# Patient Record
Sex: FEMALE | Race: BLACK OR AFRICAN AMERICAN | NOT HISPANIC OR LATINO | Employment: FULL TIME | URBAN - METROPOLITAN AREA
[De-identification: names, ages, dates, MRNs, and addresses within clinical notes are randomized per-mention and may not be internally consistent; named-entity substitution may affect disease eponyms.]

---

## 2022-05-12 ENCOUNTER — HOSPITAL ENCOUNTER (EMERGENCY)
Facility: CLINIC | Age: 37
Discharge: HOME | End: 2022-05-12
Attending: EMERGENCY MEDICINE | Admitting: EMERGENCY MEDICINE
Payer: COMMERCIAL

## 2022-05-12 VITALS
HEIGHT: 61 IN | DIASTOLIC BLOOD PRESSURE: 88 MMHG | SYSTOLIC BLOOD PRESSURE: 139 MMHG | BODY MASS INDEX: 29.62 KG/M2 | WEIGHT: 156.9 LBS | RESPIRATION RATE: 14 BRPM | HEART RATE: 84 BPM | OXYGEN SATURATION: 100 %

## 2022-05-12 DIAGNOSIS — H10.13 ALLERGIC CONJUNCTIVITIS, BILATERAL: ICD-10-CM

## 2022-05-12 PROCEDURE — 99284 EMERGENCY DEPT VISIT MOD MDM: CPT

## 2022-05-12 PROCEDURE — 99284 EMERGENCY DEPT VISIT MOD MDM: CPT | Performed by: EMERGENCY MEDICINE

## 2022-05-12 PROCEDURE — 250N000009 HC RX 250: Performed by: EMERGENCY MEDICINE

## 2022-05-12 RX ORDER — CETIRIZINE HYDROCHLORIDE 10 MG/1
10 TABLET ORAL DAILY
Qty: 30 TABLET | Refills: 0 | Status: SHIPPED | OUTPATIENT
Start: 2022-05-12 | End: 2022-08-11

## 2022-05-12 RX ORDER — OLOPATADINE HYDROCHLORIDE 1 MG/ML
1 SOLUTION/ DROPS OPHTHALMIC 2 TIMES DAILY
Qty: 10 ML | Refills: 0 | Status: SHIPPED | OUTPATIENT
Start: 2022-05-12 | End: 2022-06-11

## 2022-05-12 RX ORDER — PROPARACAINE HYDROCHLORIDE 5 MG/ML
1 SOLUTION/ DROPS OPHTHALMIC ONCE
Status: COMPLETED | OUTPATIENT
Start: 2022-05-12 | End: 2022-05-12

## 2022-05-12 RX ADMIN — FLUORESCEIN SODIUM 1 STRIP: 1 STRIP OPHTHALMIC at 11:58

## 2022-05-12 RX ADMIN — PROPARACAINE HYDROCHLORIDE 1 DROP: 5 SOLUTION/ DROPS OPHTHALMIC at 11:58

## 2022-05-12 ASSESSMENT — ENCOUNTER SYMPTOMS
EYE DISCHARGE: 1
VOMITING: 0
COUGH: 0
EYE PAIN: 1
EYE ITCHING: 1
SORE THROAT: 0
NAUSEA: 0
HEADACHES: 0
PHOTOPHOBIA: 0
SHORTNESS OF BREATH: 0
DIFFICULTY URINATING: 0
EYE REDNESS: 0
NECK PAIN: 0
FEVER: 0
DYSURIA: 0
ABDOMINAL PAIN: 0
BACK PAIN: 0
RHINORRHEA: 1

## 2022-05-12 ASSESSMENT — VISUAL ACUITY: OU: 1

## 2022-05-12 NOTE — DISCHARGE INSTRUCTIONS
Use Patanol eyedrops as directed  Use cetirizine as directed.    Follow-up with your primary care clinic in 1 to 2 weeks if not improving.    Return the emergency department if worsening symptoms, trouble with your vision, or other concerns.

## 2022-05-12 NOTE — ED PROVIDER NOTES
ED Provider Note  [facility], [place]      History     Chief Complaint   Patient presents with     Eye Pain     Headache     HPI  Hannah Cornejo is a 36 year old female who presents to the emergency department 3-day history of sneezing, watery eyes, and itchy eyes.  Patient states that she has been scratching her eye and now feels that her eyes are painful as well.  She reports normal vision.  She reports that her head hurts when she sneezes but otherwise denies persistent headache.  She denies any photophobia.  No nausea or vomiting.  No sore throat.  No fevers.  She reports that she is COVID vaccinated.  She has a history of seasonal allergies but states she is not taking any medications currently.    Past Medical History  History reviewed. No pertinent past medical history.  History reviewed. No pertinent surgical history.  cetirizine (ZYRTEC) 10 MG tablet  olopatadine (PATANOL) 0.1 % ophthalmic solution  PANTOPRAZOLE SODIUM PO      Allergies   Allergen Reactions     Beta Adrenergic Blockers Other (See Comments)     Contraindicated with allergy injections  Contraindicated with allergy injections       Meclizine Other (See Comments)     Reported generalized weakness and dry mouth when taking this along with imitrex.  Requested both be placed on her allergy list.      Sumatriptan Other (See Comments)     Reported generalized weakness and dry mouth when taking this together with meclizine. Requested both be placed on her allergy list.      Family History  History reviewed. No pertinent family history.  Social History   Social History     Tobacco Use     Smoking status: Never Smoker     Smokeless tobacco: Never Used   Substance Use Topics     Alcohol use: No     Drug use: No      Past medical history, past surgical history, medications, allergies, family history, and social history were reviewed with the patient. No additional pertinent items.       Review of Systems   Constitutional: Negative for  "fever.   HENT: Positive for rhinorrhea. Negative for sore throat.    Eyes: Positive for pain, discharge and itching. Negative for photophobia, redness and visual disturbance.   Respiratory: Negative for cough and shortness of breath.    Cardiovascular: Negative for chest pain.   Gastrointestinal: Negative for abdominal pain, nausea and vomiting.   Genitourinary: Negative for difficulty urinating and dysuria.   Musculoskeletal: Negative for back pain and neck pain.   Skin: Negative for rash.   Neurological: Negative for headaches.   All other systems reviewed and are negative.    A complete review of systems was performed with pertinent positives and negatives noted in the HPI, and all other systems negative.    Physical Exam   BP: 139/88  Pulse: 84  Resp: 16  Height: 154.9 cm (5' 1\")  Weight: 71.2 kg (156 lb 14.4 oz)  SpO2: 100 %  Physical Exam  Vitals and nursing note reviewed.   Constitutional:       Appearance: Normal appearance.   HENT:      Head: Normocephalic and atraumatic.      Nose: Nose normal.   Eyes:      General: Lids are normal. Vision grossly intact.         Right eye: No foreign body.         Left eye: No foreign body.      Extraocular Movements: Extraocular movements intact.      Right eye: Normal extraocular motion.      Left eye: Normal extraocular motion.      Conjunctiva/sclera:      Right eye: Right conjunctiva is injected.      Left eye: Left conjunctiva is injected.      Pupils: Pupils are equal, round, and reactive to light.      Right eye: No corneal abrasion or fluorescein uptake.      Left eye: No corneal abrasion or fluorescein uptake.      Slit lamp exam:     Right eye: Anterior chamber quiet. No corneal flare or foreign body.      Left eye: Anterior chamber quiet. No corneal flare or foreign body.   Cardiovascular:      Rate and Rhythm: Normal rate and regular rhythm.      Pulses: Normal pulses.      Heart sounds: Normal heart sounds.   Pulmonary:      Effort: Pulmonary effort is " normal.      Breath sounds: Normal breath sounds.   Neurological:      General: No focal deficit present.      Mental Status: She is alert.      Cranial Nerves: No cranial nerve deficit.      Motor: No weakness.      Coordination: Coordination normal.   Psychiatric:         Mood and Affect: Mood normal.         Behavior: Behavior normal.         ED Course      Procedures       The medical record was reviewed and interpreted.   utilized.              No results found for any visits on 05/12/22.  Medications   proparacaine (ALCAINE) 0.5 % ophthalmic solution 1 drop (1 drop Both Eyes Given 5/12/22 1158)   fluorescein (FUL-RUSSEL) ophthalmic strip 1 strip (1 strip Both Eyes Given 5/12/22 1158)        Assessments & Plan (with Medical Decision Making)   36 year old female with history of seasonal allergies to the emergency department with clear rhinorrhea, sneezing, tearing of the eyes, and pruritus.  The patient has been scratching her eyes and is concerned that she injured her eye.  She has no evidence for fluorescein uptake or abnormality on slit-lamp exam.  Her conjunctiva are injected.  Her symptoms are consistent with allergic conjunctivitis.  Patient will be discharged home with prescriptions for cetirizine and Patanol.  Primary care follow-up in 1 week as needed.  No evidence for infectious symptoms or corneal injury.    I have reviewed the nursing notes. I have reviewed the findings, diagnosis, plan and need for follow up with the patient.    New Prescriptions    CETIRIZINE (ZYRTEC) 10 MG TABLET    Take 1 tablet (10 mg) by mouth daily    OLOPATADINE (PATANOL) 0.1 % OPHTHALMIC SOLUTION    Apply 1 drop to eye 2 times daily       Final diagnoses:   Allergic conjunctivitis, bilateral     Chart documentation was completed with Dragon voice-recognition software. Even though reviewed, this chart may still contain some grammatical, spelling, and word errors.     --  Kenny Muñoz Md  [facility]  EMERGENCY DEPARTMENT  5/12/2022     Kenny Muñoz MD  05/12/22 1752

## 2022-08-11 ENCOUNTER — OFFICE VISIT (OUTPATIENT)
Dept: URGENT CARE | Facility: URGENT CARE | Age: 37
End: 2022-08-11
Payer: COMMERCIAL

## 2022-08-11 VITALS
HEIGHT: 61 IN | RESPIRATION RATE: 15 BRPM | OXYGEN SATURATION: 97 % | HEART RATE: 83 BPM | BODY MASS INDEX: 29.27 KG/M2 | DIASTOLIC BLOOD PRESSURE: 82 MMHG | TEMPERATURE: 97.2 F | SYSTOLIC BLOOD PRESSURE: 118 MMHG | WEIGHT: 155 LBS

## 2022-08-11 DIAGNOSIS — H10.13 ALLERGIC CONJUNCTIVITIS, BILATERAL: Primary | ICD-10-CM

## 2022-08-11 DIAGNOSIS — J30.2 SEASONAL ALLERGIC RHINITIS, UNSPECIFIED TRIGGER: ICD-10-CM

## 2022-08-11 PROCEDURE — 99203 OFFICE O/P NEW LOW 30 MIN: CPT | Performed by: PHYSICIAN ASSISTANT

## 2022-08-11 RX ORDER — OLOPATADINE HYDROCHLORIDE 1 MG/ML
1 SOLUTION/ DROPS OPHTHALMIC 2 TIMES DAILY
COMMUNITY

## 2022-08-11 RX ORDER — CARBOXYMETHYLCELLULOSE SODIUM 5 MG/ML
1 SOLUTION/ DROPS OPHTHALMIC 4 TIMES DAILY
Qty: 1 EACH | Refills: 3 | Status: SHIPPED | OUTPATIENT
Start: 2022-08-11

## 2022-08-11 RX ORDER — OLOPATADINE HYDROCHLORIDE 1 MG/ML
1 SOLUTION/ DROPS OPHTHALMIC 2 TIMES DAILY
Qty: 5 ML | Refills: 3 | Status: SHIPPED | OUTPATIENT
Start: 2022-08-11

## 2022-08-11 RX ORDER — CETIRIZINE HYDROCHLORIDE 10 MG/1
10 TABLET ORAL DAILY
Qty: 90 TABLET | Refills: 1 | Status: SHIPPED | OUTPATIENT
Start: 2022-08-11

## 2022-08-11 RX ORDER — FLUTICASONE PROPIONATE 50 MCG
1 SPRAY, SUSPENSION (ML) NASAL DAILY
Qty: 16 G | Refills: 3 | Status: SHIPPED | OUTPATIENT
Start: 2022-08-11

## 2022-08-11 ASSESSMENT — ENCOUNTER SYMPTOMS
EYE ITCHING: 1
WHEEZING: 0
EYE REDNESS: 1
FEVER: 0
COUGH: 0

## 2022-08-12 NOTE — PROGRESS NOTES
Assessment & Plan:        ICD-10-CM    1. Allergic conjunctivitis, bilateral  H10.13 olopatadine (PATANOL) 0.1 % ophthalmic solution     carboxymethylcellulose PF (CARBOXYMETHYLCELLULOSE SODIUM) 0.5 % ophthalmic solution   2. Seasonal allergic rhinitis, unspecified trigger  J30.2 cetirizine (ZYRTEC) 10 MG tablet     fluticasone (FLONASE) 50 MCG/ACT nasal spray         Plan/Clinical Decision Making:    Patient with seasonal allergies and allergic conjunctivitis.   Exam consistent with allergies.     Patient Instructions   Use the medications on a daily basis, that is what will work the best.     1) Use the allergy eye drops twice a day and the refresh drops 4x a day.     2) Use the oral pill and start the nasal spray.     3) it can take a week of using all of these to get relief.     Follow up in 1 week for recheck.     Thanks,  Melva Pace PA-C        Return in about 1 week (around 8/18/2022).     At the end of the encounter, I discussed results, diagnosis, medications. Discussed red flags for immediate return to clinic/ER, as well as indications for follow up if no improvement. Patient understood and agreed to plan. Patient was stable for discharge.        Melva Pace PA-C on 8/11/2022 at 7:10 PM          Subjective:     HPI:    Hannah is a 37 year old female who presents to clinic today for the following health issues:  Chief Complaint   Patient presents with     Urgent Care     Derm Problem     Face and eyes are bothering her, itching. On and off for a while but consistent for 1 week.      HPI    Itching face, eyes, sneezing, scratchy, itchy throat.   Off and on.  This past week worse.   Was seen for allergic conjunctivitis in May, treated with Patanol, not working.   Was prescribed Zyrtec in past, finished with that and not on now.   Has used nasal spray in past- Flonase, felt like it didn't work.     History obtained from the patient.    Review of Systems   Constitutional: Negative for fever.  "  HENT: Positive for congestion and sneezing.    Eyes: Positive for redness and itching. Negative for photophobia and visual disturbance.   Respiratory: Negative for cough and wheezing.          There is no problem list on file for this patient.       No past medical history on file.    Social History     Tobacco Use     Smoking status: Never Smoker     Smokeless tobacco: Never Used   Substance Use Topics     Alcohol use: No             Objective:     Vitals:    08/11/22 1845   BP: 118/82   Pulse: 83   Resp: 15   Temp: 97.2  F (36.2  C)   TempSrc: Temporal   SpO2: 97%   Weight: 70.3 kg (155 lb)   Height: 1.549 m (5' 1\")         Physical Exam   EXAM:   Pleasant, alert, appropriate appearance. NAD.  Head Exam: Normocephalic, atraumatic.  Eye Exam:  Mild erythema of bilateral conjunctiva, slight water discharge, no sign of FB or lesions.   Ear Exam: TMs grey without bulging. Normal canals.  Normal pinna.  Nose Exam: Normal external nose.    OroPharynx Exam:  Moist mucous membranes. No erythema, pharynx without exudate or hypertrophy.  Neck/Thyroid Exam:  No LAD.    Chest/Respiratory Exam: CTAB.  Cardiovascular Exam: RRR. No murmur or rubs.  Skin: no rash or lesion.      Results:  No results found for any visits on 08/11/22.    "

## 2022-08-12 NOTE — PATIENT INSTRUCTIONS
Use the medications on a daily basis, that is what will work the best.     1) Use the allergy eye drops twice a day and the refresh drops 4x a day.     2) Use the oral pill and start the nasal spray.     3) it can take a week of using all of these to get relief.     Follow up in 1 week for recheck.     Thanks,  Melva Pace PA-C

## 2022-08-27 ASSESSMENT — ENCOUNTER SYMPTOMS: PHOTOPHOBIA: 0
